# Patient Record
Sex: FEMALE | Race: WHITE | NOT HISPANIC OR LATINO | URBAN - METROPOLITAN AREA
[De-identification: names, ages, dates, MRNs, and addresses within clinical notes are randomized per-mention and may not be internally consistent; named-entity substitution may affect disease eponyms.]

---

## 2021-11-23 ENCOUNTER — OFFICE VISIT (OUTPATIENT)
Dept: URBAN - METROPOLITAN AREA SURGERY CENTER 15 | Facility: SURGERY CENTER | Age: 61
End: 2021-11-23
Payer: COMMERCIAL

## 2021-11-23 DIAGNOSIS — Z80.0 BROTHER AT YOUNG AGE FAMILY HISTORY OF COLON CANCER: ICD-10-CM

## 2021-11-23 DIAGNOSIS — Z12.11 AVERAGE RISK FOR CRC. DUE TO PT'S CO-MORBID STATE WITH END STAGE DEMENTIA, HIGH RISK FOR ANESTHESIA (PER NEUROLOGY); INABILITY TO TAKE A BOWEL PREP....WOULD NOT ADVISE ANY COLORECTAL CANCER SCREENING INCLUDING STOOL TEST FOR FECAL BLOOD.: ICD-10-CM

## 2021-11-23 PROCEDURE — 45378 DIAGNOSTIC COLONOSCOPY: CPT | Performed by: INTERNAL MEDICINE

## 2021-11-23 PROCEDURE — G8907 PT DOC NO EVENTS ON DISCHARG: HCPCS | Performed by: INTERNAL MEDICINE

## 2022-10-26 ENCOUNTER — LAB OUTSIDE AN ENCOUNTER (OUTPATIENT)
Dept: URBAN - METROPOLITAN AREA CLINIC 78 | Facility: CLINIC | Age: 62
End: 2022-10-26

## 2022-10-26 ENCOUNTER — OFFICE VISIT (OUTPATIENT)
Dept: URBAN - METROPOLITAN AREA CLINIC 78 | Facility: CLINIC | Age: 62
End: 2022-10-26
Payer: COMMERCIAL

## 2022-10-26 VITALS
HEIGHT: 68 IN | BODY MASS INDEX: 23.86 KG/M2 | HEART RATE: 69 BPM | DIASTOLIC BLOOD PRESSURE: 80 MMHG | WEIGHT: 157.4 LBS | SYSTOLIC BLOOD PRESSURE: 137 MMHG

## 2022-10-26 DIAGNOSIS — R10.9 ABDOMINAL PAIN: ICD-10-CM

## 2022-10-26 DIAGNOSIS — R63.4 WEIGHT LOSS, UNINTENTIONAL: ICD-10-CM

## 2022-10-26 DIAGNOSIS — R04.0 EPISTAXIS: ICD-10-CM

## 2022-10-26 DIAGNOSIS — R79.89 ELEVATED LFTS: ICD-10-CM

## 2022-10-26 PROCEDURE — 99214 OFFICE O/P EST MOD 30 MIN: CPT | Performed by: INTERNAL MEDICINE

## 2022-10-26 NOTE — HPI-TODAY'S VISIT:
She was diagnosed with CA Bladder in summer of 2021 She had a Cystoscopy and tumor excision - the margins were clear She did not have any chemo or radiation SHe was having quarterly cystoscopies In september of 22 she was noted to have abnormal cells She had cystoscopic removal and had a bladder chemo wash x 1  - ?? meds  She has a h/o RA - 12 years ago - she had symptoms x 3 years b4 that She has been on Biologics off and on She has been on Orencia She had allergic reactions to other meds  In Aug 22, she was treated for bone spurs - they sliced thru the Achilles tendon - the spurs had to be surgically removed. The tendon was replaced. She developed infection She has been on abx for a few weeks - now completed the course  Patient has been having abdominal pain over the last few weeks She has post prandial pain She feels like eating but avoids She has lost a few lbs of wt  End of July she was first noted to have elevated LFTs - in prep for the foot surgery She has been noted to have worsening of her LFTs AN US was done in septemebr - results ????  SHe has a h/o nose bleeds She gets occassional episodes She is not taking blood thinners

## 2022-10-27 ENCOUNTER — OFFICE VISIT (OUTPATIENT)
Dept: URBAN - METROPOLITAN AREA SURGERY CENTER 15 | Facility: SURGERY CENTER | Age: 62
End: 2022-10-27
Payer: COMMERCIAL

## 2022-10-27 ENCOUNTER — CLAIMS CREATED FROM THE CLAIM WINDOW (OUTPATIENT)
Dept: URBAN - METROPOLITAN AREA CLINIC 4 | Facility: CLINIC | Age: 62
End: 2022-10-27
Payer: COMMERCIAL

## 2022-10-27 DIAGNOSIS — R10.13 ABDOMINAL DISCOMFORT, EPIGASTRIC: ICD-10-CM

## 2022-10-27 DIAGNOSIS — K31.89 OTHER DISEASES OF STOMACH AND DUODENUM: ICD-10-CM

## 2022-10-27 DIAGNOSIS — R63.4 ABNORMAL INTENTIONAL WEIGHT LOSS: ICD-10-CM

## 2022-10-27 PROCEDURE — 88305 TISSUE EXAM BY PATHOLOGIST: CPT | Performed by: PATHOLOGY

## 2022-10-27 PROCEDURE — 88312 SPECIAL STAINS GROUP 1: CPT | Performed by: PATHOLOGY

## 2022-10-27 PROCEDURE — G8907 PT DOC NO EVENTS ON DISCHARG: HCPCS | Performed by: INTERNAL MEDICINE

## 2022-10-27 PROCEDURE — 43239 EGD BIOPSY SINGLE/MULTIPLE: CPT | Performed by: INTERNAL MEDICINE

## 2022-11-01 LAB
ACTIN (SMOOTH MUSCLE) ANTIBODY (IGG): <20
ANA SCREEN, IFA: POSITIVE
CERULOPLASMIN: 29
CHOL/HDLC RATIO: 4.1
CHOLESTEROL, TOTAL: 195
FERRITIN, SERUM: 121
HBSAG SCREEN: (no result)
HDL CHOLESTEROL: 48
HEP A AB, IGM: (no result)
HEP B CORE AB, IGM: (no result)
HEP C VIRUS AB: 0.1
HEPATITIS C ANTIBODY: (no result)
LDL CHOLESTEROL CALC: 128
LKM-1 ANTIBODY (IGG): <=20
MITOCHONDRIAL (M2) ANTIBODY: <=20
NON HDL CHOLESTEROL: 147
TRIGLYCERIDES: 88

## 2022-11-04 ENCOUNTER — LAB OUTSIDE AN ENCOUNTER (OUTPATIENT)
Dept: URBAN - METROPOLITAN AREA CLINIC 78 | Facility: CLINIC | Age: 62
End: 2022-11-04

## 2022-11-11 ENCOUNTER — LAB OUTSIDE AN ENCOUNTER (OUTPATIENT)
Dept: URBAN - METROPOLITAN AREA CLINIC 78 | Facility: CLINIC | Age: 62
End: 2022-11-11

## 2022-11-11 ENCOUNTER — OFFICE VISIT (OUTPATIENT)
Dept: URBAN - METROPOLITAN AREA CLINIC 78 | Facility: CLINIC | Age: 62
End: 2022-11-11
Payer: COMMERCIAL

## 2022-11-11 VITALS
DIASTOLIC BLOOD PRESSURE: 69 MMHG | HEART RATE: 75 BPM | HEIGHT: 68 IN | WEIGHT: 155 LBS | SYSTOLIC BLOOD PRESSURE: 135 MMHG | TEMPERATURE: 97.7 F | BODY MASS INDEX: 23.49 KG/M2

## 2022-11-11 DIAGNOSIS — M06.9 RHEUMATOID ARTHRITIS, INVOLVING UNSPECIFIED SITE, UNSPECIFIED WHETHER RHEUMATOID FACTOR PRESENT: ICD-10-CM

## 2022-11-11 DIAGNOSIS — R79.89 ELEVATED LFTS: ICD-10-CM

## 2022-11-11 DIAGNOSIS — E78.00 ELEVATED LDL CHOLESTEROL LEVEL: ICD-10-CM

## 2022-11-11 DIAGNOSIS — R63.4 WEIGHT LOSS, UNINTENTIONAL: ICD-10-CM

## 2022-11-11 DIAGNOSIS — R10.9 ABDOMINAL PAIN: ICD-10-CM

## 2022-11-11 PROCEDURE — 99214 OFFICE O/P EST MOD 30 MIN: CPT | Performed by: INTERNAL MEDICINE

## 2022-11-11 NOTE — HPI-TODAY'S VISIT:
11/11/22  Patient is here in f/u She continues to have intermittent RUQ pain Recent labs show mild elevation of LDL She has a h/o RA Recent MRI shows a contracted GB ----------------------------------------  She was diagnosed with CA Bladder in summer of 2021 She had a Cystoscopy and tumor excision - the margins were clear She did not have any chemo or radiation SHe was having quarterly cystoscopies In september of 22 she was noted to have abnormal cells She had cystoscopic removal and had a bladder chemo wash x 1  - ?? meds  She has a h/o RA - 12 years ago - she had symptoms x 3 years b4 that She has been on Biologics off and on She has been on Orencia She had allergic reactions to other meds  In Aug 22, she was treated for bone spurs - they sliced thru the Achilles tendon - the spurs had to be surgically removed. The tendon was replaced. She developed infection She has been on abx for a few weeks - now completed the course  Patient has been having abdominal pain over the last few weeks She has post prandial pain She feels like eating but avoids She has lost a few lbs of wt  End of July she was first noted to have elevated LFTs - in prep for the foot surgery She has been noted to have worsening of her LFTs AN US was done in septemebr - results ????  SHe has a h/o nose bleeds She gets occassional episodes She is not taking blood thinners

## 2022-11-11 NOTE — PHYSICAL EXAM GASTROINTESTINAL
Abdomen , soft, nontender, nondistended , no guarding or rigidity , no masses palpable , normal bowel sounds , Liver and Spleen , no hepatomegaly present , no hepatosplenomegaly , liver nontender , spleen not palpable Vascular stents/Clips/Artificial joint

## 2022-12-05 ENCOUNTER — TELEPHONE ENCOUNTER (OUTPATIENT)
Dept: URBAN - METROPOLITAN AREA CLINIC 23 | Facility: CLINIC | Age: 62
End: 2022-12-05

## 2022-12-09 ENCOUNTER — LAB OUTSIDE AN ENCOUNTER (OUTPATIENT)
Dept: URBAN - METROPOLITAN AREA CLINIC 78 | Facility: CLINIC | Age: 62
End: 2022-12-09

## 2023-01-06 ENCOUNTER — LAB OUTSIDE AN ENCOUNTER (OUTPATIENT)
Dept: URBAN - METROPOLITAN AREA CLINIC 78 | Facility: CLINIC | Age: 63
End: 2023-01-06

## 2023-01-06 ENCOUNTER — OFFICE VISIT (OUTPATIENT)
Dept: URBAN - METROPOLITAN AREA CLINIC 78 | Facility: CLINIC | Age: 63
End: 2023-01-06
Payer: COMMERCIAL

## 2023-01-06 VITALS
WEIGHT: 162.4 LBS | HEIGHT: 68 IN | BODY MASS INDEX: 24.61 KG/M2 | RESPIRATION RATE: 16 BRPM | HEART RATE: 72 BPM | SYSTOLIC BLOOD PRESSURE: 147 MMHG | TEMPERATURE: 98.2 F | DIASTOLIC BLOOD PRESSURE: 78 MMHG

## 2023-01-06 DIAGNOSIS — R63.4 WEIGHT LOSS, UNINTENTIONAL: ICD-10-CM

## 2023-01-06 DIAGNOSIS — R10.11 RUQ ABDOMINAL PAIN: ICD-10-CM

## 2023-01-06 DIAGNOSIS — M06.9 RHEUMATOID ARTHRITIS, INVOLVING UNSPECIFIED SITE, UNSPECIFIED WHETHER RHEUMATOID FACTOR PRESENT: ICD-10-CM

## 2023-01-06 DIAGNOSIS — R10.9 ABDOMINAL PAIN: ICD-10-CM

## 2023-01-06 DIAGNOSIS — R79.89 ELEVATED LFTS: ICD-10-CM

## 2023-01-06 DIAGNOSIS — E78.00 ELEVATED LDL CHOLESTEROL LEVEL: ICD-10-CM

## 2023-01-06 PROCEDURE — 99214 OFFICE O/P EST MOD 30 MIN: CPT | Performed by: INTERNAL MEDICINE

## 2023-01-06 NOTE — HPI-TODAY'S VISIT:
1/6/23  Patient is here in f/u She continues to have RUQ pain NO exacerbation with cough / sneeze / laughing No CP / SOB SHe says it persists almost 75% of the time ALl the inv were reviwed again HIDA scan is wnl wit a great EF EGD / Colon / MRI were d/w pt again  She claims that the pain got worse after eating 2 chicken strips in the last 2 days    ------------------------------------------------  11/11/22  Patient is here in f/u She continues to have intermittent RUQ pain Recent labs show mild elevation of LDL She has a h/o RA Recent MRI shows a contracted GB ----------------------------------------  She was diagnosed with CA Bladder in summer of 2021 She had a Cystoscopy and tumor excision - the margins were clear She did not have any chemo or radiation SHe was having quarterly cystoscopies In september of 22 she was noted to have abnormal cells She had cystoscopic removal and had a bladder chemo wash x 1  - ?? meds  She has a h/o RA - 12 years ago - she had symptoms x 3 years b4 that She has been on Biologics off and on She has been on Orencia She had allergic reactions to other meds  In Aug 22, she was treated for bone spurs - they sliced thru the Achilles tendon - the spurs had to be surgically removed. The tendon was replaced. She developed infection She has been on abx for a few weeks - now completed the course  Patient has been having abdominal pain over the last few weeks She has post prandial pain She feels like eating but avoids She has lost a few lbs of wt  End of July she was first noted to have elevated LFTs - in prep for the foot surgery She has been noted to have worsening of her LFTs AN US was done in septemebr - results ????  SHe has a h/o nose bleeds She gets occassional episodes She is not taking blood thinners

## 2023-01-16 ENCOUNTER — LAB OUTSIDE AN ENCOUNTER (OUTPATIENT)
Dept: URBAN - METROPOLITAN AREA CLINIC 78 | Facility: CLINIC | Age: 63
End: 2023-01-16

## 2023-01-16 LAB
CREATININE POC: 0.8
PERFORMING LAB: (no result)

## 2023-01-18 ENCOUNTER — OFFICE VISIT (OUTPATIENT)
Dept: URBAN - METROPOLITAN AREA CLINIC 78 | Facility: CLINIC | Age: 63
End: 2023-01-18

## 2023-01-20 ENCOUNTER — OFFICE VISIT (OUTPATIENT)
Dept: URBAN - METROPOLITAN AREA CLINIC 78 | Facility: CLINIC | Age: 63
End: 2023-01-20
Payer: COMMERCIAL

## 2023-01-20 VITALS
HEART RATE: 66 BPM | DIASTOLIC BLOOD PRESSURE: 81 MMHG | RESPIRATION RATE: 16 BRPM | BODY MASS INDEX: 24.4 KG/M2 | SYSTOLIC BLOOD PRESSURE: 145 MMHG | TEMPERATURE: 98.2 F | WEIGHT: 161 LBS | HEIGHT: 68 IN

## 2023-01-20 DIAGNOSIS — R63.4 WEIGHT LOSS, UNINTENTIONAL: ICD-10-CM

## 2023-01-20 DIAGNOSIS — R79.89 ELEVATED LFTS: ICD-10-CM

## 2023-01-20 DIAGNOSIS — E78.00 ELEVATED LDL CHOLESTEROL LEVEL: ICD-10-CM

## 2023-01-20 DIAGNOSIS — R10.11 RUQ ABDOMINAL PAIN: ICD-10-CM

## 2023-01-20 DIAGNOSIS — M06.9 RHEUMATOID ARTHRITIS, INVOLVING UNSPECIFIED SITE, UNSPECIFIED WHETHER RHEUMATOID FACTOR PRESENT: ICD-10-CM

## 2023-01-20 DIAGNOSIS — R10.9 ABDOMINAL PAIN: ICD-10-CM

## 2023-01-20 PROBLEM — 445445006: Status: ACTIVE | Noted: 2022-11-11

## 2023-01-20 PROBLEM — 69896004: Status: ACTIVE | Noted: 2022-11-11

## 2023-01-20 PROCEDURE — 99214 OFFICE O/P EST MOD 30 MIN: CPT | Performed by: INTERNAL MEDICINE

## 2023-01-20 NOTE — HPI-TODAY'S VISIT:
1/20/23  Patient is here in f/u She continues to have intermittent episodic sharp RUQ pain now more prominent in the back below her rt scapula MRI showed a contracted GB HIDA scan is wnl CT Chest is wnl  -------------------------------  1/6/23  Patient is here in f/u She continues to have RUQ pain NO exacerbation with cough / sneeze / laughing No CP / SOB SHe says it persists almost 75% of the time ALl the inv were reviwed again HIDA scan is wnl wit a great EF EGD / Colon / MRI were d/w pt again  She claims that the pain got worse after eating 2 chicken strips in the last 2 days    ------------------------------------------------  11/11/22  Patient is here in f/u She continues to have intermittent RUQ pain Recent labs show mild elevation of LDL She has a h/o RA Recent MRI shows a contracted GB ----------------------------------------  She was diagnosed with CA Bladder in summer of 2021 She had a Cystoscopy and tumor excision - the margins were clear She did not have any chemo or radiation SHe was having quarterly cystoscopies In september of 22 she was noted to have abnormal cells She had cystoscopic removal and had a bladder chemo wash x 1  - ?? meds  She has a h/o RA - 12 years ago - she had symptoms x 3 years b4 that She has been on Biologics off and on She has been on Orencia She had allergic reactions to other meds  In Aug 22, she was treated for bone spurs - they sliced thru the Achilles tendon - the spurs had to be surgically removed. The tendon was replaced. She developed infection She has been on abx for a few weeks - now completed the course  Patient has been having abdominal pain over the last few weeks She has post prandial pain She feels like eating but avoids She has lost a few lbs of wt  End of July she was first noted to have elevated LFTs - in prep for the foot surgery She has been noted to have worsening of her LFTs AN US was done in septemebr - results ????  SHe has a h/o nose bleeds She gets occassional episodes She is not taking blood thinners

## 2023-07-13 ENCOUNTER — OFFICE VISIT (OUTPATIENT)
Dept: URBAN - METROPOLITAN AREA CLINIC 78 | Facility: CLINIC | Age: 63
End: 2023-07-13
Payer: COMMERCIAL

## 2023-07-13 VITALS
DIASTOLIC BLOOD PRESSURE: 85 MMHG | BODY MASS INDEX: 24.71 KG/M2 | RESPIRATION RATE: 16 BRPM | SYSTOLIC BLOOD PRESSURE: 132 MMHG | WEIGHT: 163 LBS | HEART RATE: 64 BPM | TEMPERATURE: 98.2 F | HEIGHT: 68 IN

## 2023-07-13 DIAGNOSIS — R19.7 DIARRHEA, UNSPECIFIED TYPE: ICD-10-CM

## 2023-07-13 DIAGNOSIS — R11.2 NAUSEA & VOMITING: ICD-10-CM

## 2023-07-13 DIAGNOSIS — R63.4 WEIGHT LOSS, UNINTENTIONAL: ICD-10-CM

## 2023-07-13 DIAGNOSIS — R10.11 RUQ ABDOMINAL PAIN: ICD-10-CM

## 2023-07-13 PROCEDURE — 99215 OFFICE O/P EST HI 40 MIN: CPT | Performed by: INTERNAL MEDICINE

## 2023-07-13 NOTE — PHYSICAL EXAM GASTROINTESTINAL
Abdomen , soft, midlly tender, nondistended , no guarding or rigidity , no masses palpable , normal bowel sounds , Liver and Spleen , no hepatomegaly present , no hepatosplenomegaly , liver nontender , spleen not palpable

## 2023-07-13 NOTE — HPI-TODAY'S VISIT:
The patient was referred to us by Yoli Mcbride for abdominal pain. A copy of this note will be sent to the referring physician.  She nmormally follows with Dr. Lane but is seeing me today as he is on PTO.  States about 6 weeks ago she started transitioning her diet to vegan. She developed abodminal pain and felt she was "making her gut angry . Her PCP recommended she increase the Omeprazole to BID.  It did not help.  The bloating worsened.   Her PCP added Hyosciamin and Zofran.  She has been afraid to eat. She is nauseous 80% of the time and has vomited a few times.  She is also having liquid diarrhea.  She is worried she can have Crohn's. No exacerbation with cough / sneeze / laughing No CP / SOB   She was diagnosed with CA Bladder in summer of 2021 She had a Cystoscopy and tumor excision - the margins were clear She did not have any chemo or radiation SHe was having quarterly cystoscopies  She has a h/o RA - 12 years ago - she had symptoms x 3 years b4 that She has been on Biologics off and on She has been on Orencia She had allergic reactions to other meds  In Aug 22, she was treated for bone spurs - they sliced thru the Achilles tendon - the spurs had to be surgically removed. The tendon was replaced. She developed infection  SHe has a h/o nose bleeds She gets occassional episodes She is not taking blood thinners   She has had extensive GI workup for abodminal pain as detailed below: MRI showed a contracted GB HIDA scan is wnl CT Chest is wnl EGD / Colon unremarkable Labs show mild elevation of LDL

## 2023-07-18 ENCOUNTER — WEB ENCOUNTER (OUTPATIENT)
Dept: URBAN - METROPOLITAN AREA CLINIC 78 | Facility: CLINIC | Age: 63
End: 2023-07-18

## 2023-07-20 ENCOUNTER — LAB OUTSIDE AN ENCOUNTER (OUTPATIENT)
Dept: URBAN - METROPOLITAN AREA CLINIC 78 | Facility: CLINIC | Age: 63
End: 2023-07-20

## 2023-07-23 LAB
ADENOVIRUS F 40/41: NOT DETECTED
CALPROTECTIN, STOOL - QDX: (no result)
CAMPYLOBACTER: NOT DETECTED
CLOSTRIDIUM DIFFICILE: NOT DETECTED
ENTAMOEBA HISTOLYTICA: NOT DETECTED
ENTEROAGGREGATIVE E.COLI: NOT DETECTED
ENTEROTOXIGENIC E.COLI: NOT DETECTED
ESCHERICHIA COLI O157: NOT DETECTED
GIARDIA LAMBLIA: NOT DETECTED
NOROVIRUS GI/GII: NOT DETECTED
PANCREATICELASTASE ELISA, STOOL: (no result)
ROTAVIRUS A: NOT DETECTED
SALMONELLA SPP.: NOT DETECTED
SHIGA-LIKE TOXIN PRODUCING E.COLI: NOT DETECTED
SHIGELLA SPP. / ENTEROINVASIVE E.COLI: NOT DETECTED
VIBRIO PARAHAEMOLYTICUS: NOT DETECTED
VIBRIO SPP.: NOT DETECTED
YERSINIA ENTEROCOLITICA: NOT DETECTED

## 2023-07-26 ENCOUNTER — OFFICE VISIT (OUTPATIENT)
Dept: URBAN - METROPOLITAN AREA CLINIC 78 | Facility: CLINIC | Age: 63
End: 2023-07-26
Payer: COMMERCIAL

## 2023-07-26 ENCOUNTER — LAB OUTSIDE AN ENCOUNTER (OUTPATIENT)
Dept: URBAN - METROPOLITAN AREA CLINIC 78 | Facility: CLINIC | Age: 63
End: 2023-07-26

## 2023-07-26 ENCOUNTER — DASHBOARD ENCOUNTERS (OUTPATIENT)
Age: 63
End: 2023-07-26

## 2023-07-26 VITALS
HEART RATE: 73 BPM | TEMPERATURE: 97.9 F | WEIGHT: 162.8 LBS | SYSTOLIC BLOOD PRESSURE: 124 MMHG | HEIGHT: 68 IN | DIASTOLIC BLOOD PRESSURE: 79 MMHG | RESPIRATION RATE: 12 BRPM | BODY MASS INDEX: 24.67 KG/M2

## 2023-07-26 DIAGNOSIS — R63.4 WEIGHT LOSS, UNINTENTIONAL: ICD-10-CM

## 2023-07-26 DIAGNOSIS — R10.84 ABDOMINAL CRAMPING, GENERALIZED: ICD-10-CM

## 2023-07-26 DIAGNOSIS — R19.7 ACUTE DIARRHEA: ICD-10-CM

## 2023-07-26 PROCEDURE — 99214 OFFICE O/P EST MOD 30 MIN: CPT | Performed by: INTERNAL MEDICINE

## 2023-07-26 NOTE — HPI-TODAY'S VISIT:
Patient is here in f/u She has recurrence of her abdo pain and cramping She has had stool studies - neg for any infection Fecal calprotectin is neg fecal elastase is wnl MRI is wnl HIDA scan is wnl EGD and Colon are unrevealing   ---------------------------------------------------  The patient was referred to us by Yoli Mcbride for abdominal pain. A copy of this note will be sent to the referring physician.  She nmormally follows with Dr. Lane but is seeing me today as he is on PTO.  States about 6 weeks ago she started transitioning her diet to vegan. She developed abodminal pain and felt she was "making her gut angry . Her PCP recommended she increase the Omeprazole to BID.  It did not help.  The bloating worsened.   Her PCP added Hyosciamin and Zofran.  She has been afraid to eat. She is nauseous 80% of the time and has vomited a few times.  She is also having liquid diarrhea.  She is worried she can have Crohn's. No exacerbation with cough / sneeze / laughing No CP / SOB   She was diagnosed with CA Bladder in summer of 2021 She had a Cystoscopy and tumor excision - the margins were clear She did not have any chemo or radiation SHe was having quarterly cystoscopies  She has a h/o RA - 12 years ago - she had symptoms x 3 years b4 that She has been on Biologics off and on She has been on Orencia She had allergic reactions to other meds  In Aug 22, she was treated for bone spurs - they sliced thru the Achilles tendon - the spurs had to be surgically removed. The tendon was replaced. She developed infection  SHe has a h/o nose bleeds She gets occassional episodes She is not taking blood thinners   She has had extensive GI workup for abodminal pain as detailed below: MRI showed a contracted GB HIDA scan is wnl CT Chest is wnl EGD / Colon unremarkable Labs show mild elevation of LDL

## 2023-08-14 ENCOUNTER — CLAIMS CREATED FROM THE CLAIM WINDOW (OUTPATIENT)
Dept: URBAN - METROPOLITAN AREA CLINIC 77 | Facility: CLINIC | Age: 63
End: 2023-08-14

## 2023-08-14 ENCOUNTER — OFFICE VISIT (OUTPATIENT)
Dept: URBAN - METROPOLITAN AREA CLINIC 77 | Facility: CLINIC | Age: 63
End: 2023-08-14

## 2023-08-14 ENCOUNTER — CLAIMS CREATED FROM THE CLAIM WINDOW (OUTPATIENT)
Dept: URBAN - METROPOLITAN AREA CLINIC 77 | Facility: CLINIC | Age: 63
End: 2023-08-14
Payer: COMMERCIAL

## 2023-08-14 DIAGNOSIS — R19.7 ACUTE DIARRHEA: ICD-10-CM

## 2023-08-14 DIAGNOSIS — R63.4 WEIGHT LOSS, UNINTENTIONAL: ICD-10-CM

## 2023-08-14 PROCEDURE — 91110 GI TRC IMG INTRAL ESOPH-ILE: CPT | Performed by: INTERNAL MEDICINE

## 2023-08-16 ENCOUNTER — OFFICE VISIT (OUTPATIENT)
Dept: URBAN - METROPOLITAN AREA CLINIC 78 | Facility: CLINIC | Age: 63
End: 2023-08-16

## 2023-08-17 ENCOUNTER — LAB OUTSIDE AN ENCOUNTER (OUTPATIENT)
Dept: URBAN - METROPOLITAN AREA CLINIC 78 | Facility: CLINIC | Age: 63
End: 2023-08-17

## 2023-08-17 ENCOUNTER — TELEPHONE ENCOUNTER (OUTPATIENT)
Dept: URBAN - METROPOLITAN AREA CLINIC 78 | Facility: CLINIC | Age: 63
End: 2023-08-17

## 2023-08-18 ENCOUNTER — LAB OUTSIDE AN ENCOUNTER (OUTPATIENT)
Dept: URBAN - METROPOLITAN AREA CLINIC 78 | Facility: CLINIC | Age: 63
End: 2023-08-18